# Patient Record
(demographics unavailable — no encounter records)

---

## 2024-12-31 NOTE — HEALTH RISK ASSESSMENT
[Good] : ~his/her~  mood as  good [Yes] : Yes [Monthly or less (1 pt)] : Monthly or less (1 point) [1 or 2 (0 pts)] : 1 or 2 (0 points) [Never (0 pts)] : Never (0 points) [No] : In the past 12 months have you used drugs other than those required for medical reasons? No [Little interest or pleasure doing things] : 1) Little interest or pleasure doing things [Feeling down, depressed, or hopeless] : 2) Feeling down, depressed, or hopeless [0] : 2) Feeling down, depressed, or hopeless: Not at all (0) [PHQ-2 Negative - No further assessment needed] : PHQ-2 Negative - No further assessment needed [Never] : Never [Audit-CScore] : 1 [[id number]] : 0 [HIV test declined] : HIV test declined [Hepatitis C test declined] : Hepatitis C test declined [I will adhere to the patient's wishes.] : I will adhere to the patient's wishes.

## 2024-12-31 NOTE — PLAN
[FreeTextEntry1] : Monitor blood pressures.  Desired blood pressure consistently less than 130/80.  Call MD with results.

## 2024-12-31 NOTE — HISTORY OF PRESENT ILLNESS
[de-identified] : Patient comes for a physical with overall no changes in exertional or functional ability.  Seen by endocrinologist for elevated testosterone with repeat testing that was normal.  No further workup was done.  Patient is otherwise asymptomatic. No acute complaints except having increased acid reflux type symptoms.

## 2025-01-22 NOTE — HISTORY OF PRESENT ILLNESS
[de-identified] : Patient comes to the office complaining of pain numbness and tingling to the left arm that is intermittent.  Occurs at rest and with activity. Not associated with any other symptoms.  No loss of power or strength.  No loss of range of motion.  No neck discomfort.  No recent trauma or strain.  No palpitations nausea or vomiting. Symptoms ongoing for about a week-2 weeks.  No change in character or severity.

## 2025-04-22 NOTE — HISTORY OF PRESENT ILLNESS
[FreeTextEntry8] : Patient complains of cough for the past 3 weeks without any shortness of breath difficulty breathing.  Occasionally has some clear sputum.  No fever sweats or chills.  Similar cough patterns this time of the year.  Has been taking antiallergy medication such as Zyrtec and Claritin without much benefit.